# Patient Record
Sex: FEMALE | Race: OTHER | HISPANIC OR LATINO | Employment: FULL TIME | URBAN - METROPOLITAN AREA
[De-identification: names, ages, dates, MRNs, and addresses within clinical notes are randomized per-mention and may not be internally consistent; named-entity substitution may affect disease eponyms.]

---

## 2022-07-20 ENCOUNTER — OFFICE VISIT (OUTPATIENT)
Dept: GASTROENTEROLOGY | Facility: CLINIC | Age: 47
End: 2022-07-20
Payer: COMMERCIAL

## 2022-07-20 ENCOUNTER — TELEPHONE (OUTPATIENT)
Dept: GASTROENTEROLOGY | Facility: CLINIC | Age: 47
End: 2022-07-20

## 2022-07-20 VITALS
WEIGHT: 169 LBS | SYSTOLIC BLOOD PRESSURE: 122 MMHG | HEART RATE: 86 BPM | HEIGHT: 65 IN | BODY MASS INDEX: 28.16 KG/M2 | DIASTOLIC BLOOD PRESSURE: 80 MMHG

## 2022-07-20 DIAGNOSIS — R11.0 NAUSEA: ICD-10-CM

## 2022-07-20 DIAGNOSIS — Z12.11 SCREENING FOR COLON CANCER: ICD-10-CM

## 2022-07-20 DIAGNOSIS — K21.9 GASTROESOPHAGEAL REFLUX DISEASE, UNSPECIFIED WHETHER ESOPHAGITIS PRESENT: ICD-10-CM

## 2022-07-20 DIAGNOSIS — R14.0 BLOATING: ICD-10-CM

## 2022-07-20 DIAGNOSIS — R10.84 GENERALIZED ABDOMINAL PAIN: Primary | ICD-10-CM

## 2022-07-20 PROCEDURE — 99204 OFFICE O/P NEW MOD 45 MIN: CPT | Performed by: NURSE PRACTITIONER

## 2022-07-20 RX ORDER — AMLODIPINE BESYLATE 10 MG/1
10 TABLET ORAL DAILY
COMMUNITY
Start: 2022-06-26

## 2022-07-20 RX ORDER — LOSARTAN POTASSIUM 100 MG/1
100 TABLET ORAL DAILY
COMMUNITY
Start: 2022-06-03

## 2022-07-20 RX ORDER — PANTOPRAZOLE SODIUM 40 MG/1
40 TABLET, DELAYED RELEASE ORAL DAILY
Qty: 30 TABLET | Refills: 6 | Status: SHIPPED | OUTPATIENT
Start: 2022-07-20 | End: 2022-08-18

## 2022-07-20 RX ORDER — METOPROLOL SUCCINATE 50 MG/1
50 TABLET, EXTENDED RELEASE ORAL DAILY
COMMUNITY
Start: 2022-06-17

## 2022-07-20 RX ORDER — ATORVASTATIN CALCIUM 20 MG/1
20 TABLET, FILM COATED ORAL DAILY
COMMUNITY
Start: 2022-07-19

## 2022-07-20 RX ORDER — TRAMADOL HYDROCHLORIDE 50 MG/1
50 TABLET ORAL EVERY 6 HOURS PRN
COMMUNITY
Start: 2022-05-01

## 2022-07-20 RX ORDER — FAMOTIDINE 20 MG/1
20 TABLET, FILM COATED ORAL 2 TIMES DAILY
Qty: 30 TABLET | Refills: 6 | Status: SHIPPED | OUTPATIENT
Start: 2022-07-20 | End: 2022-08-03

## 2022-07-20 RX ORDER — DULAGLUTIDE 3 MG/.5ML
INJECTION, SOLUTION SUBCUTANEOUS
COMMUNITY
Start: 2022-05-04

## 2022-07-20 RX ORDER — POLYETHYLENE GLYCOL 3350, SODIUM SULFATE ANHYDROUS, SODIUM BICARBONATE, SODIUM CHLORIDE, POTASSIUM CHLORIDE 236; 22.74; 6.74; 5.86; 2.97 G/4L; G/4L; G/4L; G/4L; G/4L
4000 POWDER, FOR SOLUTION ORAL ONCE
Qty: 4000 ML | Refills: 0 | OUTPATIENT
Start: 2022-07-20 | End: 2022-09-14

## 2022-07-20 NOTE — PROGRESS NOTES
[facility] - Outpatient Consultation  Luwanna Mohs 49 y o  female MRN: [id number]  Encounter: [id number]    ASSESSMENT AND PLAN:      1  Generalized abdominal pain  2  Bloating  3  Nausea  Patient referred by her PCP for evaluation of stomach pain  Patient states she has been having abdominal discomfort, bloating and nausea for approximately 3 months  States she does have increased symptoms at times after eating  She states that dairy and meats make it worse  States her nausea is pretty consistent  On exam, patient does complain of generalized abdominal discomfort increased with palpation  Consider diabetic gastroparesis, IBS, celiac, H pylori, SIBO, diverticular disease  Pending results of EGD and colonoscopy consider gastric emptying scan and or breath test for SIBO  Biopsy for celiac and H pylori     - CBC and differential; Future  - Comprehensive metabolic panel  - TSH, 3rd generation; Future  - CBC and differential  - TSH, 3rd generation  - Colonoscopy/EGD schedule that [address] [place] [address]    4  Gastroesophageal reflux disease, unspecified whether esophagitis present  Patient states that she does have increased acid reflux symptoms  She states the are increased at night as well  She states the symptoms are fairly constant  She denies difficulty swallowing  Discussed with patient and her daughter starting a trial of pantoprazole 40 mg daily prior to a m  Meal and also famotidine 20 mg to be taken HS  Continuation of medication may depend on EGD findings  Consider GERD, functional dyspepsia  - decreased caffeine use  - EGD scheduled at [address] [place] [address]  - pantoprazole (PROTONIX) 40 mg tablet; Take 1 tablet (40 mg total) by mouth daily  Dispense: 30 tablet; Refill: 6  - famotidine (PEPCID) 20 mg tablet; Take 1 tablet (20 mg total) by mouth 2 (two) times a day  Dispense: 30 tablet; Refill: 6    5  Screening for colon cancer  Patient states she has never had a colonoscopy    Denies family history of colon cancer     - Colonoscopy scheduled at [facility])  - polyethylene glycol (Golytely) 4000 mL solution; Take 4,000 mL by mouth once for 1 dose Take 4000 mL by mouth once for 1 dose  Use as directed  Dispense: 4000 mL; Refill: 0      Followup Appointment:  After procedures  ______________________________________________________________________    Chief Complaint   Patient presents with    Bloating, nausea when eating     Referred by Dr Aline Tejeda         HPI:   Valente Duverney is a 49y o  year old female referred by her PCP, accompanied by her daughter Lindy Manjarrez with past medical history of diabetes, hypertension, hyperlipidemia presents presents with abdominal pain, bloating, nausea and acid reflux symptoms for approximately the last 3 months  Patient states she does get abdominal discomfort increased bloating approximately 1 hour after eating  States she has constant nausea and rare vomiting  She denies hematemesis with vomiting  She states she does have acid reflux symptoms which increased at nighttime  She does state that dairy and meats have been noted to be a trigger  States he is having daily normal bowel movements  Denies hematochezia or melena  Nonsmoker, denies ETOH use  States she has never had a colonoscopy  Denies family history of colon cancer      Historical Information   Past Medical History:   Diagnosis Date    Diabetes mellitus ([facility] [address] )     Hyperlipidemia     Hypertension      Past Surgical History:   Procedure Laterality Date    APPENDECTOMY       SECTION       Social History     Substance and Sexual Activity   Alcohol Use Not Currently     Social History     Substance and Sexual Activity   Drug Use Not on file     Social History     Tobacco Use   Smoking Status Never Smoker   Smokeless Tobacco Never Used     Family History   Problem Relation Age of Onset    Colon polyps Neg Hx     Colon cancer Neg Hx        Meds/Allergies     Current Outpatient Medications:     amLODIPine (NORVASC) 10 mg tablet    atorvastatin (LIPITOR) 20 mg tablet    famotidine (PEPCID) 20 mg tablet    losartan (COZAAR) 100 MG tablet    metFORMIN (GLUCOPHAGE) 1000 MG tablet    metoprolol succinate (TOPROL-XL) 50 mg 24 hr tablet    pantoprazole (PROTONIX) 40 mg tablet    polyethylene glycol (Golytely) 4000 mL solution    traMADol (ULTRAM) 50 mg tablet    Trulicity 3 KT/1 1KL SOPN    No Known Allergies    PHYSICAL EXAM:    Blood pressure 122/80, pulse 86, height 5' 5" (1 651 m), weight 76 7 kg (169 lb)  Body mass index is 28 12 kg/m²  General Appearance: NAD, cooperative, alert, language barrier, daughter is present to assist with interpretation  Eyes: Anicteric, PERRLA, EOMI  ENT:  Normocephalic, atraumatic, normal mucosa  Neck:  Supple, symmetrical, trachea midline,   Resp:  Clear to auscultation bilaterally; no rales, rhonchi or wheezing; respirations unlabored   CV:  S1 S2, Regular rate and rhythm; no murmur, rub, or gallop  GI:  Soft, generalized abdominal discomfort increased with palpation, non-distended; normal bowel sounds; no masses, no organomegaly   Rectal: Deferred  Musculoskeletal: No cyanosis, clubbing or edema  Normal ROM  Skin:  No jaundice, rashes, or lesions   Heme/Lymph: No palpable cervical lymphadenopathy  Psych: Normal affect, good eye contact  Neuro: No gross deficits, AAOx3    Lab Results:   No results found for: WBC, HGB, HCT, MCV, PLT  No results found for: NA, K, CL, CO2, ANIONGAP, BUN, CREATININE, GLUCOSE, GLUF, CALCIUM, CORRECTEDCA, AST, ALT, ALKPHOS, PROT, BILITOT, EGFR  No results found for: IRON, TIBC, FERRITIN  No results found for: LIPASE    Radiology Results:   No results found  REVIEW OF SYSTEMS:    CONSTITUTIONAL: Denies any fever, chills, rigors, and weight loss  HEENT: No earache or tinnitus  Denies hearing loss or visual disturbances  CARDIOVASCULAR: No chest pain or palpitations     RESPIRATORY: Denies any cough, hemoptysis, shortness of breath or dyspnea on exertion  GASTROINTESTINAL: As noted in the History of Present Illness  GENITOURINARY: No problems with urination  Denies any hematuria or dysuria  NEUROLOGIC: No dizziness or vertigo, denies headaches  MUSCULOSKELETAL: Denies any muscle or joint pain  SKIN: Denies skin rashes or itching  ENDOCRINE: Denies excessive thirst  Denies intolerance to heat or cold  PSYCHOSOCIAL: Denies depression or anxiety  Denies any recent memory loss

## 2022-07-20 NOTE — TELEPHONE ENCOUNTER
Scheduled date of colonoscopy (as of today):9/14/22  Physician performing colonoscopy:Dr Grant Desouza  Location of colonoscopy:[facility]  Bowel prep reviewed with patient:Natalie  Instructions reviewed with patient by: Zayda Hanson  Clearances: No

## 2022-08-03 DIAGNOSIS — K21.9 GASTROESOPHAGEAL REFLUX DISEASE, UNSPECIFIED WHETHER ESOPHAGITIS PRESENT: ICD-10-CM

## 2022-08-03 RX ORDER — FAMOTIDINE 20 MG/1
TABLET, FILM COATED ORAL
Qty: 180 TABLET | Refills: 2 | Status: SHIPPED | OUTPATIENT
Start: 2022-08-03 | End: 2022-10-25 | Stop reason: ALTCHOICE

## 2022-08-18 DIAGNOSIS — K21.9 GASTROESOPHAGEAL REFLUX DISEASE, UNSPECIFIED WHETHER ESOPHAGITIS PRESENT: ICD-10-CM

## 2022-08-18 RX ORDER — PANTOPRAZOLE SODIUM 40 MG/1
TABLET, DELAYED RELEASE ORAL
Qty: 90 TABLET | Refills: 3 | Status: SHIPPED | OUTPATIENT
Start: 2022-08-18

## 2022-08-30 LAB
ALBUMIN SERPL-MCNC: 4.4 G/DL (ref 3.8–4.8)
ALBUMIN/GLOB SERPL: 1.4 {RATIO} (ref 1.2–2.2)
ALP SERPL-CCNC: 103 IU/L (ref 44–121)
ALT SERPL-CCNC: 29 IU/L (ref 0–32)
AST SERPL-CCNC: 20 IU/L (ref 0–40)
BASOPHILS # BLD AUTO: 0.1 X10E3/UL (ref 0–0.2)
BASOPHILS NFR BLD AUTO: 1 %
BILIRUB SERPL-MCNC: 0.3 MG/DL (ref 0–1.2)
BUN SERPL-MCNC: 11 MG/DL (ref 6–24)
BUN/CREAT SERPL: 15 (ref 9–23)
CALCIUM SERPL-MCNC: 9.2 MG/DL (ref 8.7–10.2)
CHLORIDE SERPL-SCNC: 101 MMOL/L (ref 96–106)
CO2 SERPL-SCNC: 20 MMOL/L (ref 20–29)
CREAT SERPL-MCNC: 0.73 MG/DL (ref 0.57–1)
EGFR: 101 ML/MIN/1.73
EOSINOPHIL # BLD AUTO: 0.1 X10E3/UL (ref 0–0.4)
EOSINOPHIL NFR BLD AUTO: 2 %
ERYTHROCYTE [DISTWIDTH] IN BLOOD BY AUTOMATED COUNT: 13.4 % (ref 11.7–15.4)
GLOBULIN SER-MCNC: 3.1 G/DL (ref 1.5–4.5)
GLUCOSE SERPL-MCNC: 314 MG/DL (ref 65–99)
HCT VFR BLD AUTO: 39.3 % (ref 34–46.6)
HGB BLD-MCNC: 12.9 G/DL (ref 11.1–15.9)
IMM GRANULOCYTES # BLD: 0 X10E3/UL (ref 0–0.1)
IMM GRANULOCYTES NFR BLD: 0 %
LYMPHOCYTES # BLD AUTO: 2.1 X10E3/UL (ref 0.7–3.1)
LYMPHOCYTES NFR BLD AUTO: 31 %
MCH RBC QN AUTO: 27.9 PG (ref 26.6–33)
MCHC RBC AUTO-ENTMCNC: 32.8 G/DL (ref 31.5–35.7)
MCV RBC AUTO: 85 FL (ref 79–97)
MONOCYTES # BLD AUTO: 0.3 X10E3/UL (ref 0.1–0.9)
MONOCYTES NFR BLD AUTO: 4 %
NEUTROPHILS # BLD AUTO: 4.3 X10E3/UL (ref 1.4–7)
NEUTROPHILS NFR BLD AUTO: 62 %
PLATELET # BLD AUTO: 362 X10E3/UL (ref 150–450)
POTASSIUM SERPL-SCNC: 4 MMOL/L (ref 3.5–5.2)
PROT SERPL-MCNC: 7.5 G/DL (ref 6–8.5)
RBC # BLD AUTO: 4.62 X10E6/UL (ref 3.77–5.28)
SODIUM SERPL-SCNC: 137 MMOL/L (ref 134–144)
TSH SERPL DL<=0.005 MIU/L-ACNC: 0.17 UIU/ML (ref 0.45–4.5)
WBC # BLD AUTO: 6.9 X10E3/UL (ref 3.4–10.8)

## 2022-09-01 ENCOUNTER — TELEPHONE (OUTPATIENT)
Dept: GASTROENTEROLOGY | Facility: CLINIC | Age: 47
End: 2022-09-01

## 2022-09-01 NOTE — TELEPHONE ENCOUNTER
Spoke to patient's daughter in regards to her blood work ordered from her last office visit  Noted elevated blood sugar and low TSH  Patient's daughter states they are aware of her TSH from the past however she will contact the patient's PCP regarding the lab results

## 2022-09-14 ENCOUNTER — HOSPITAL ENCOUNTER (OUTPATIENT)
Dept: GASTROENTEROLOGY | Facility: AMBULATORY SURGERY CENTER | Age: 47
Discharge: HOME | End: 2022-09-14
Payer: COMMERCIAL

## 2022-09-14 ENCOUNTER — ANESTHESIA (OUTPATIENT)
Dept: GASTROENTEROLOGY | Facility: AMBULATORY SURGERY CENTER | Age: 47
End: 2022-09-14

## 2022-09-14 ENCOUNTER — ANESTHESIA EVENT (OUTPATIENT)
Dept: GASTROENTEROLOGY | Facility: AMBULATORY SURGERY CENTER | Age: 47
End: 2022-09-14

## 2022-09-14 VITALS
HEART RATE: 65 BPM | RESPIRATION RATE: 15 BRPM | SYSTOLIC BLOOD PRESSURE: 165 MMHG | DIASTOLIC BLOOD PRESSURE: 95 MMHG | WEIGHT: 169 LBS | HEIGHT: 65 IN | BODY MASS INDEX: 28.16 KG/M2 | OXYGEN SATURATION: 100 % | TEMPERATURE: 99 F

## 2022-09-14 DIAGNOSIS — R14.0 BLOATING: ICD-10-CM

## 2022-09-14 DIAGNOSIS — R11.0 NAUSEA: ICD-10-CM

## 2022-09-14 DIAGNOSIS — R10.84 GENERALIZED ABDOMINAL PAIN: ICD-10-CM

## 2022-09-14 DIAGNOSIS — Z12.11 SCREENING FOR COLON CANCER: ICD-10-CM

## 2022-09-14 DIAGNOSIS — K21.9 GASTROESOPHAGEAL REFLUX DISEASE, UNSPECIFIED WHETHER ESOPHAGITIS PRESENT: ICD-10-CM

## 2022-09-14 LAB
EXT PREGNANCY TEST URINE: NEGATIVE
EXT. CONTROL: NORMAL

## 2022-09-14 PROCEDURE — 43239 EGD BIOPSY SINGLE/MULTIPLE: CPT | Performed by: INTERNAL MEDICINE

## 2022-09-14 PROCEDURE — 45385 COLONOSCOPY W/LESION REMOVAL: CPT | Performed by: INTERNAL MEDICINE

## 2022-09-14 PROCEDURE — 45380 COLONOSCOPY AND BIOPSY: CPT | Performed by: INTERNAL MEDICINE

## 2022-09-14 RX ORDER — PROPOFOL 10 MG/ML
INJECTION, EMULSION INTRAVENOUS AS NEEDED
Status: DISCONTINUED | OUTPATIENT
Start: 2022-09-14 | End: 2022-09-14

## 2022-09-14 RX ORDER — SODIUM CHLORIDE, SODIUM LACTATE, POTASSIUM CHLORIDE, CALCIUM CHLORIDE 600; 310; 30; 20 MG/100ML; MG/100ML; MG/100ML; MG/100ML
INJECTION, SOLUTION INTRAVENOUS CONTINUOUS PRN
Status: DISCONTINUED | OUTPATIENT
Start: 2022-09-14 | End: 2022-09-14

## 2022-09-14 RX ORDER — SODIUM CHLORIDE, SODIUM LACTATE, POTASSIUM CHLORIDE, CALCIUM CHLORIDE 600; 310; 30; 20 MG/100ML; MG/100ML; MG/100ML; MG/100ML
50 INJECTION, SOLUTION INTRAVENOUS CONTINUOUS
Status: DISCONTINUED | OUTPATIENT
Start: 2022-09-14 | End: 2022-09-14

## 2022-09-14 RX ORDER — LIDOCAINE HYDROCHLORIDE 10 MG/ML
INJECTION, SOLUTION EPIDURAL; INFILTRATION; INTRACAUDAL; PERINEURAL AS NEEDED
Status: DISCONTINUED | OUTPATIENT
Start: 2022-09-14 | End: 2022-09-14

## 2022-09-14 RX ADMIN — PROPOFOL 50 MG: 10 INJECTION, EMULSION INTRAVENOUS at 07:40

## 2022-09-14 RX ADMIN — PROPOFOL 50 MG: 10 INJECTION, EMULSION INTRAVENOUS at 07:46

## 2022-09-14 RX ADMIN — PROPOFOL 150 MG: 10 INJECTION, EMULSION INTRAVENOUS at 07:32

## 2022-09-14 RX ADMIN — SODIUM CHLORIDE, SODIUM LACTATE, POTASSIUM CHLORIDE, CALCIUM CHLORIDE: 600; 310; 30; 20 INJECTION, SOLUTION INTRAVENOUS at 07:27

## 2022-09-14 RX ADMIN — SODIUM CHLORIDE, SODIUM LACTATE, POTASSIUM CHLORIDE, CALCIUM CHLORIDE: 600; 310; 30; 20 INJECTION, SOLUTION INTRAVENOUS at 08:09

## 2022-09-14 RX ADMIN — LIDOCAINE HYDROCHLORIDE 80 MG: 10 INJECTION, SOLUTION EPIDURAL; INFILTRATION; INTRACAUDAL; PERINEURAL at 07:32

## 2022-09-14 RX ADMIN — PROPOFOL 30 MG: 10 INJECTION, EMULSION INTRAVENOUS at 08:02

## 2022-09-14 RX ADMIN — PROPOFOL 40 MG: 10 INJECTION, EMULSION INTRAVENOUS at 08:06

## 2022-09-14 RX ADMIN — PROPOFOL 30 MG: 10 INJECTION, EMULSION INTRAVENOUS at 07:56

## 2022-09-14 RX ADMIN — PROPOFOL 50 MG: 10 INJECTION, EMULSION INTRAVENOUS at 07:41

## 2022-09-14 RX ADMIN — SODIUM CHLORIDE, SODIUM LACTATE, POTASSIUM CHLORIDE, CALCIUM CHLORIDE 50 ML/HR: 600; 310; 30; 20 INJECTION, SOLUTION INTRAVENOUS at 07:24

## 2022-09-14 RX ADMIN — PROPOFOL 50 MG: 10 INJECTION, EMULSION INTRAVENOUS at 07:48

## 2022-09-14 RX ADMIN — PROPOFOL 50 MG: 10 INJECTION, EMULSION INTRAVENOUS at 07:51

## 2022-09-14 NOTE — PROGRESS NOTES
Blood sugar recheck post procedure was 290  Dr Sarah Rodriguez notified  NNO   Patient and son reminded to follow up with  about high blood sugars

## 2022-09-14 NOTE — ANESTHESIA PREPROCEDURE EVALUATION
Procedure:  COLONOSCOPY  EGD    Relevant Problems   CARDIO   (+) Hyperlipidemia   (+) Hypertension      GI/HEPATIC   (+) Gastroesophageal reflux disease      Endocrine   (+) Diabetes mellitus (HCC)        Physical Exam    Airway    Mallampati score: III  TM Distance: >3 FB  Neck ROM: full     Dental       Cardiovascular      Pulmonary      Other Findings        Anesthesia Plan  ASA Score- 2     Anesthesia Type- IV sedation with anesthesia with ASA Monitors  Additional Monitors:   Airway Plan:           Plan Factors-Exercise tolerance (METS): >4 METS  Chart reviewed  Existing labs reviewed  Patient summary reviewed  Patient is not a current smoker  Induction- intravenous  Postoperative Plan-     Informed Consent- Anesthetic plan and risks discussed with patient  I personally reviewed this patient with the CRNA  Discussed and agreed on the Anesthesia Plan with the CRNA  Belinda Smith

## 2022-09-14 NOTE — H&P
History and Physical - [facility]    Ti Uribe 49 y o  female MRN: [id number]      HPI: Ti Uribe is a 49y o  year old female who presents for generalized abd pain, n/v  1st colonoscopy, avg risk  REVIEW OF SYSTEMS: Per the HPI, and otherwise unremarkable      Historical Information     Past Medical History:   Diagnosis Date    Diabetes mellitus ([address] )     Hyperlipidemia     Hypertension      Past Surgical History:   Procedure Laterality Date    APPENDECTOMY       SECTION       Social History   Social History     Substance and Sexual Activity   Alcohol Use Not Currently     Social History     Substance and Sexual Activity   Drug Use Never     Social History     Tobacco Use   Smoking Status Never Smoker   Smokeless Tobacco Never Used     Family History   Problem Relation Age of Onset    Colon polyps Neg Hx     Colon cancer Neg Hx        Meds/Allergies       Current Outpatient Medications:     amLODIPine (NORVASC) 10 mg tablet    atorvastatin (LIPITOR) 20 mg tablet    famotidine (PEPCID) 20 mg tablet    losartan (COZAAR) 100 MG tablet    metFORMIN (GLUCOPHAGE) 1000 MG tablet    metoprolol succinate (TOPROL-XL) 50 mg 24 hr tablet    pantoprazole (PROTONIX) 40 mg tablet    traMADol (ULTRAM) 50 mg tablet    Trulicity 3 PS/6 7NE SOPN    polyethylene glycol (Golytely) 4000 mL solution    Current Facility-Administered Medications:     lactated ringers infusion, 50 mL/hr, Intravenous, Continuous    No Known Allergies    Objective     /61   Pulse 61   Temp 99 °F (37 2 °C) (Temporal)   Resp 19   Ht 5' 5" (1 651 m)   Wt 76 7 kg (169 lb)   LMP 2022   SpO2 99%   BMI 28 12 kg/m²       PHYSICAL EXAM    Gen: NAD AAOx3  Head: Normocephalic, Atraumatic  CV: S1S2 RRR no m/r/g  CHEST: Clear b/l no c/r/w  ABD: soft, +BS NT/ND no masses  EXT: no edema      ASSESSMENT/PLAN:  This is a 49y o  year old female here for EGD/COLON, and she is stable and optimized for her procedure

## 2022-09-14 NOTE — ANESTHESIA POSTPROCEDURE EVALUATION
Post-Op Assessment Note    CV Status:  Stable    Pain management: adequate     Mental Status:  Sleepy   Hydration Status:  Euvolemic   PONV Controlled:  Controlled   Airway Patency:  Patent      Post Op Vitals Reviewed: Yes      Staff: Anesthesiologist, CRNA         No complications documented      /76 (09/14/22 0811)    Temp      Pulse 56 (09/14/22 0811)   Resp 14 (09/14/22 0811)    SpO2 100 % (09/14/22 0811)

## 2022-09-28 ENCOUNTER — TELEPHONE (OUTPATIENT)
Dept: GASTROENTEROLOGY | Facility: CLINIC | Age: 47
End: 2022-09-28

## 2022-10-25 ENCOUNTER — OFFICE VISIT (OUTPATIENT)
Dept: GASTROENTEROLOGY | Facility: CLINIC | Age: 47
End: 2022-10-25
Payer: COMMERCIAL

## 2022-10-25 VITALS
SYSTOLIC BLOOD PRESSURE: 126 MMHG | WEIGHT: 167 LBS | BODY MASS INDEX: 27.82 KG/M2 | HEIGHT: 65 IN | DIASTOLIC BLOOD PRESSURE: 80 MMHG

## 2022-10-25 DIAGNOSIS — K21.9 GASTROESOPHAGEAL REFLUX DISEASE, UNSPECIFIED WHETHER ESOPHAGITIS PRESENT: Primary | ICD-10-CM

## 2022-10-25 DIAGNOSIS — R10.13 EPIGASTRIC PAIN: ICD-10-CM

## 2022-10-25 DIAGNOSIS — Z86.010 HISTORY OF COLON POLYPS: ICD-10-CM

## 2022-10-25 PROCEDURE — 99213 OFFICE O/P EST LOW 20 MIN: CPT | Performed by: NURSE PRACTITIONER

## 2022-10-25 RX ORDER — DICYCLOMINE HYDROCHLORIDE 10 MG/1
10 CAPSULE ORAL
Qty: 120 CAPSULE | Refills: 4 | Status: SHIPPED | OUTPATIENT
Start: 2022-10-25

## 2022-10-25 NOTE — PATIENT INSTRUCTIONS
Preparation H for hemorrhoid treatment   20-25 g of fiber per day   MiraLax, adjust for bowel movement every day or every other day   Stool softener, Colace 100 mg  once or twice a day      Adequate fluids

## 2022-10-27 DIAGNOSIS — A04.8 H. PYLORI INFECTION: Primary | ICD-10-CM

## 2022-10-27 RX ORDER — BISMUTH SUBCITRATE POTASSIUM, METRONIDAZOLE, TETRACYCLINE HYDROCHLORIDE 140; 125; 125 MG/1; MG/1; MG/1
3 CAPSULE ORAL
Qty: 120 CAPSULE | Refills: 0 | Status: SHIPPED | OUTPATIENT
Start: 2022-10-27 | End: 2022-11-06

## 2022-10-27 RX ORDER — PANTOPRAZOLE SODIUM 40 MG/1
40 TABLET, DELAYED RELEASE ORAL DAILY
Qty: 10 TABLET | Refills: 0 | Status: SHIPPED | OUTPATIENT
Start: 2022-10-27

## 2022-11-18 DIAGNOSIS — R10.13 EPIGASTRIC PAIN: ICD-10-CM

## 2022-11-18 RX ORDER — DICYCLOMINE HYDROCHLORIDE 10 MG/1
CAPSULE ORAL
Qty: 360 CAPSULE | Refills: 2 | Status: SHIPPED | OUTPATIENT
Start: 2022-11-18

## 2022-12-05 NOTE — TELEPHONE ENCOUNTER
NURSING: + H pylori on antral bx - called pt, no answer, left VM - pt has appointment coming up in 10/25 - so emphasized she keeps that appointment  Will need treatment for H pylori at that time  Nonurgent if she calls back, just ask pt to f/u then and Herm can tx the H pylori       Biopsies otherwise ok  No

## 2022-12-16 NOTE — PROGRESS NOTES
[facility] - Outpatient Follow-up Note  Ajay Juarez 49 y o  female MRN: [id number]  Encounter: [id number]    ASSESSMENT AND PLAN:      1  Gastroesophageal reflux disease, unspecified whether esophagitis present  Patient states since being placed on Protonix 40 mg daily she has not had any acid reflux symptoms  She does however state if she misses a dose she does experience acid reflux symptoms  - continue Protonix 40 mg daily  - reinforced dietary discretion    2  Epigastric pain  On exam patient does exhibit increased epigastric discomfort with palpation  She states her abdominal discomfort is increased overnight and after eating a meal   Discussed with patient proper dietary discretion  Will trial Bentyl 10 mg prior to meals and HS  May consider adding Pepcid 20-40 mg HS if unsuccessful with Bentyl trial     - no eating 2-3 hours prior to bedtime  - dicyclomine (BENTYL) 10 mg capsule; Take 1 capsule (10 mg total) by mouth 4 (four) times a day (before meals and at bedtime)  Dispense: 120 capsule; Refill: 4    3  History of colon polyps  Colonoscopy 09/14/2022; 7 year recall    4  Hemorrhoids  Discussed with patient using preparation H for inflamed hemorrhoids  Also discussed if bleeding continues can be scheduled for banding procedure  Also discussed adequate fiber intake and fluids  Would also consider stool softeners and MiraLax for constipation to reduce straining for bowel movements  Followup Appointment:  3-4 months  ______________________________________________________________________    Chief Complaint   Patient presents with   • Follow-up     HPI:  14-year-old female accompanied by her daughter Esme Flores with past medical history of diabetes and GERD presents for follow-up EGD and colonoscopy 09/14/2022  On exam, patient denies nausea or vomiting  States she is having increased abdominal discomfort however notes it happens more at nighttime    She does have My Asthma Action Plan    Name: Aaron Watson   YOB: 2017  Date: 12/16/2022   My doctor: Nina Hollingsworth MD   My clinic: [facility]        My Rescue Medicine:   Albuterol nebulizer solution 1 vial EVERY 4 HOURS as needed    - OR -  Albuterol inhaler (Proair/Ventolin/Proventil HFA)  2 puffs EVERY 4 HOURS as needed. Use a spacer if recommended by your provider.   My Asthma Severity:   Intermittent / Exercise Induced       The medication may be given at school or day care?: Yes  Child can carry and use inhaler at school with approval of school nurse?: No       GREEN ZONE   Good Control    I feel good    No cough or wheeze    Can work, sleep and play without asthma symptoms       Take your asthma control medicine every day.     1. If exercise triggers your asthma, take your rescue medication    15 minutes before exercise or sports, and    During exercise if you have asthma symptoms  2. Spacer to use with inhaler: If you have a spacer, make sure to use it with your inhaler             YELLOW ZONE Getting Worse  I have ANY of these:    I do not feel good    Cough or wheeze    Chest feels tight    Wake up at night   1. Keep taking your Green Zone medications  2. Start taking your rescue medicine:    every 20 minutes for up to 1 hour. Then every 4 hours for 24-48 hours.  3. If you stay in the Yellow Zone for more than 12-24 hours, contact your doctor.  4. If you do not return to the Green Zone in 12-24 hours or you get worse, start taking your oral steroid medicine if prescribed by your provider.           RED ZONE Medical Alert - Get Help  I have ANY of these:    I feel awful    Medicine is not helping    Breathing getting harder    Trouble walking or talking    Nose opens wide to breathe       1. Take your rescue medicine NOW  2. If your provider has prescribed an oral steroid medicine, start taking it NOW  3. Call your doctor NOW  4. If you are still in the Red Zone after 20  epigastric discomfort with palpation on exam   Denies acid reflux symptoms while taking Protonix 40 mg daily  States she is having daily normal bowel movements  Denies hematochezia or melena  Colonoscopy revealed 2 sessile polyps, small internal hemorrhoid, 7 year recall  EGD centrally normal, biopsy for H pylori and celiac negative  Patient inquired about what to do regarding hemorrhoids noted on colonoscopy  Explained if she was to have increased discomfort or bleeding she can call the office and make an appointment for a banding procedure  Historical Information   Past Medical History:   Diagnosis Date   • Diabetes mellitus ([address] )    • Hyperlipidemia    • Hypertension      Past Surgical History:   Procedure Laterality Date   • APPENDECTOMY     •  SECTION       Social History     Substance and Sexual Activity   Alcohol Use Not Currently     Social History     Substance and Sexual Activity   Drug Use Never     Social History     Tobacco Use   Smoking Status Never Smoker   Smokeless Tobacco Never Used     Family History   Problem Relation Age of Onset   • Colon polyps Neg Hx    • Colon cancer Neg Hx          Current Outpatient Medications:   •  dicyclomine (BENTYL) 10 mg capsule  •  amLODIPine (NORVASC) 10 mg tablet  •  atorvastatin (LIPITOR) 20 mg tablet  •  losartan (COZAAR) 100 MG tablet  •  metFORMIN (GLUCOPHAGE) 1000 MG tablet  •  metoprolol succinate (TOPROL-XL) 50 mg 24 hr tablet  •  pantoprazole (PROTONIX) 40 mg tablet  •  traMADol (ULTRAM) 50 mg tablet  •  Trulicity 3 ZZ/3 9YG SOPN  No Known Allergies  Reviewed medications and allergies and updated as indicated    PHYSICAL EXAM:    Blood pressure 126/80, height 5' 5" (1 651 m), weight 75 8 kg (167 lb)  Body mass index is 27 79 kg/m²  General Appearance: NAD, cooperative, alert  Eyes: Anicteric, PERRLA, EOMI  ENT:  Normocephalic, atraumatic, normal mucosa      Neck:  Supple, symmetrical, trachea midline  Resp:  Clear to auscultation minutes and you have not reached your doctor:    Take your rescue medicine again and    Call 911 or go to the emergency room right away    See your regular doctor within 2 weeks of an Emergency Room or Urgent Care visit for follow-up treatment.          Annual Reminders:  Meet with Asthma Educator. Make sure your child gets their flu shot in the fall and is up to date with all vaccines.    Pharmacy: [facility] [id number] - [place], [place] - [address]    Electronically signed by Nina Hollingsworth MD   Date: 12/16/22                        Asthma Triggers  How To Control Things That Make Your Asthma Worse     Triggers are things that make your asthma worse.  Look at the list below to help you find your triggers and what you can do about them.  You can help prevent asthma flare-ups by staying away from your triggers.      Trigger                                                          What you can do   Cigarette Smoke  Tobacco smoke can make asthma worse. Do not allow smoking in your home, car or around you.  Be sure no one smokes at a child s day care or school.  If you smoke, ask your health care provider for ways to help you quit.  Ask family members to quit too.  Ask your health care provider for a referral to Quit Plan to help you quit smoking, or call 1-164-716-PLAN.     Colds, Flu, Bronchitis  These are common triggers of asthma. Wash your hands often.  Don t touch your eyes, nose or mouth.  Get a flu shot every year.     Dust Mites  These are tiny bugs that live in cloth or carpet. They are too small to see. Wash sheets and blankets in hot water every week.   Encase pillows and mattress in dust mite proof covers.  Avoid having carpet if you can. If you have carpet, vacuum weekly.   Use a dust mask and HEPA vacuum.   Pollen and Outdoor Mold  Some people are allergic to trees, grass, or weed pollen, or molds. Try to keep your windows closed.  Limit time out doors when pollen count is high.   Ask you health  bilaterally; no rales, rhonchi or wheezing; respirations unlabored   CV:  S1 S2, Regular rate and rhythm; no murmur, rub, or gallop  GI:  Soft, epigastric pain with palpation, non-distended; normal bowel sounds; no masses, no organomegaly   Rectal: Deferred  Musculoskeletal: No cyanosis, clubbing or edema  Normal ROM  Skin:  No jaundice, rashes, or lesions   Heme/Lymph: No palpable cervical lymphadenopathy  Psych: Normal affect, good eye contact  Neuro: No gross deficits, AAOx3    Lab Results:   Lab Results   Component Value Date    WBC 6 9 08/29/2022    HGB 12 9 08/29/2022    HCT 39 3 08/29/2022    MCV 85 08/29/2022     08/29/2022     Lab Results   Component Value Date    K 4 0 08/29/2022     08/29/2022    CO2 20 08/29/2022    BUN 11 08/29/2022    CREATININE 0 73 08/29/2022    AST 20 08/29/2022    ALT 29 08/29/2022    EGFR 101 08/29/2022     No results found for: IRON, TIBC, FERRITIN  No results found for: LIPASE    Radiology Results:   No results found  care provider about taking medicine during allergy season.     Animal Dander  Some people are allergic to skin flakes, urine or saliva from pets with fur or feathers. Keep pets with fur or feathers out of your home.    If you can t keep the pet outdoors, then keep the pet out of your bedroom.  Keep the bedroom door closed.  Keep pets off cloth furniture and away from stuffed toys.     Mice, Rats, and Cockroaches  Some people are allergic to the waste from these pests.   Cover food and garbage.  Clean up spills and food crumbs.  Store grease in the refrigerator.   Keep food out of the bedroom.   Indoor Mold  This can be a trigger if your home has high moisture. Fix leaking faucets, pipes, or other sources of water.   Clean moldy surfaces.  Dehumidify basement if it is damp and smelly.   Smoke, Strong Odors, and Sprays  These can reduce air quality. Stay away from strong odors and sprays, such as perfume, powder, hair spray, paints, smoke incense, paint, cleaning products, candles and new carpet.   Exercise or Sports  Some people with asthma have this trigger. Be active!  Ask your doctor about taking medicine before sports or exercise to prevent symptoms.    Warm up for 5-10 minutes before and after sports or exercise.     Other Triggers of Asthma  Cold air:  Cover your nose and mouth with a scarf.  Sometimes laughing or crying can be a trigger.  Some medicines and food can trigger asthma.

## 2023-01-31 ENCOUNTER — OFFICE VISIT (OUTPATIENT)
Dept: GASTROENTEROLOGY | Facility: CLINIC | Age: 48
End: 2023-01-31

## 2023-01-31 VITALS
WEIGHT: 164 LBS | BODY MASS INDEX: 27.32 KG/M2 | SYSTOLIC BLOOD PRESSURE: 140 MMHG | DIASTOLIC BLOOD PRESSURE: 96 MMHG | HEIGHT: 65 IN

## 2023-01-31 DIAGNOSIS — R10.13 EPIGASTRIC PAIN: ICD-10-CM

## 2023-01-31 DIAGNOSIS — K29.70 HELICOBACTER PYLORI GASTRITIS: ICD-10-CM

## 2023-01-31 DIAGNOSIS — Z86.010 HISTORY OF COLON POLYPS: ICD-10-CM

## 2023-01-31 DIAGNOSIS — B96.81 HELICOBACTER PYLORI GASTRITIS: ICD-10-CM

## 2023-01-31 DIAGNOSIS — K21.9 GASTROESOPHAGEAL REFLUX DISEASE WITHOUT ESOPHAGITIS: Primary | ICD-10-CM

## 2023-01-31 NOTE — PROGRESS NOTES
[address] [facility] - Outpatient Follow-up Note  Deneen Chavez 51 y o  female MRN: [id number]  Encounter: [id number]    ASSESSMENT AND PLAN:      1  Gastroesophageal reflux disease without esophagitis  2  Epigastric pain  3  Helicobacter pylori gastritis  Patient was positive for H  pylori on EGD biopsy 2022  Treated with Pylera  Denies any GI complications at this time  Denies any abdominal pain or nausea or vomiting  Patient states she had stopped taking the pantoprazole when her prescription ran out  She denies any acid reflux symptoms or breakthrough symptoms     - H  pylori antigen, stool    4  History of colon polyps  Colonoscopy 2022; 7-year recall      Followup Appointment: 1 year, as needed  ______________________________________________________________________    Chief Complaint   Patient presents with   • follow up     HPI: 50-year-old female accompanied by her daughter Maria Luz Ochoa with past medical history of GERD, diabetes, hypertension, hyperlipidemia presents for 3-month follow-up EGD and colonoscopy  Colonoscopy 2022; 2 polyps, hemorrhoid, 7-year recall  EGD 2022; EGD, negative biopsy for celiac, positive H  Pylori  Patient was treated for H  pylori with Pylera  Schedule patient for eradication testing for H  Pylori  On exam, patient denies any nausea, vomiting or abdominal pain  Denies any acid reflux symptoms and is no longer taking pantoprazole 40 mg daily  States she is having daily normal bowel movements  Denies hematochezia or melena        Historical Information   Past Medical History:   Diagnosis Date   • Diabetes mellitus ([facility] [address] )    • Hyperlipidemia    • Hypertension      Past Surgical History:   Procedure Laterality Date   • APPENDECTOMY     •  SECTION       Social History     Substance and Sexual Activity   Alcohol Use Not Currently     Social History     Substance and Sexual Activity   Drug Use Never     Social History     Tobacco Use Smoking Status Never   Smokeless Tobacco Never     Family History   Problem Relation Age of Onset   • Colon polyps Neg Hx    • Colon cancer Neg Hx          Current Outpatient Medications:   •  amLODIPine (NORVASC) 10 mg tablet  •  atorvastatin (LIPITOR) 20 mg tablet  •  dicyclomine (BENTYL) 10 mg capsule  •  losartan (COZAAR) 100 MG tablet  •  metFORMIN (GLUCOPHAGE) 1000 MG tablet  •  metoprolol succinate (TOPROL-XL) 50 mg 24 hr tablet  •  pantoprazole (PROTONIX) 40 mg tablet  •  traMADol (ULTRAM) 50 mg tablet  •  Trulicity 3 CX/7 3SI SOPN  •  bismuth-metronidazole-tetracycline (PYLERA) 140-125-125 MG per capsule  •  pantoprazole (PROTONIX) 40 mg tablet  No Known Allergies  Reviewed medications and allergies and updated as indicated    PHYSICAL EXAM:    Blood pressure 140/96, height 5' 5" (1 651 m), weight 74 4 kg (164 lb)  Body mass index is 27 29 kg/m²  Normal exam    General Appearance: NAD, cooperative, alert  Eyes: Anicteric, PERRLA, EOMI  ENT:  Normocephalic, atraumatic, normal mucosa  Neck:  Supple, symmetrical, trachea midline  Resp:  Clear to auscultation bilaterally; no rales, rhonchi or wheezing; respirations unlabored   CV:  S1 S2, Regular rate and rhythm; no murmur, rub, or gallop  GI:  Soft, non-tender, non-distended; normal bowel sounds; no masses, no organomegaly   Rectal: Deferred  Musculoskeletal: No cyanosis, clubbing or edema  Normal ROM    Skin:  No jaundice, rashes, or lesions   Heme/Lymph: No palpable cervical lymphadenopathy  Psych: Normal affect, good eye contact  Neuro: No gross deficits, AAOx3    Lab Results:   Lab Results   Component Value Date    WBC 6 9 08/29/2022    HGB 12 9 08/29/2022    HCT 39 3 08/29/2022    MCV 85 08/29/2022     08/29/2022     Lab Results   Component Value Date    K 4 0 08/29/2022     08/29/2022    CO2 20 08/29/2022    BUN 11 08/29/2022    CREATININE 0 73 08/29/2022    AST 20 08/29/2022    ALT 29 08/29/2022    EGFR 101 08/29/2022     No results found for: IRON, TIBC, FERRITIN  No results found for: LIPASE    Radiology Results:   No results found

## 2023-02-26 LAB — H PYLORI AG STL QL IA: NEGATIVE

## 2023-12-22 PROCEDURE — 99283 EMERGENCY DEPT VISIT LOW MDM: CPT

## 2023-12-23 ENCOUNTER — HOSPITAL ENCOUNTER (EMERGENCY)
Facility: HOSPITAL | Age: 48
Discharge: HOME | End: 2023-12-23
Attending: EMERGENCY MEDICINE
Payer: COMMERCIAL

## 2023-12-23 VITALS
RESPIRATION RATE: 18 BRPM | WEIGHT: 165 LBS | TEMPERATURE: 97.2 F | HEIGHT: 65 IN | DIASTOLIC BLOOD PRESSURE: 77 MMHG | HEART RATE: 81 BPM | BODY MASS INDEX: 27.49 KG/M2 | OXYGEN SATURATION: 100 % | SYSTOLIC BLOOD PRESSURE: 161 MMHG

## 2023-12-23 DIAGNOSIS — M54.50 ACUTE LOW BACK PAIN: Primary | ICD-10-CM

## 2023-12-23 DIAGNOSIS — R81 GLUCOSURIA: ICD-10-CM

## 2023-12-23 LAB
BILIRUB UR QL STRIP: NEGATIVE
CLARITY UR: CLEAR
COLOR UR: YELLOW
EXT PREGNANCY TEST URINE: NEGATIVE
EXT. CONTROL: NORMAL
GLUCOSE UR STRIP-MCNC: ABNORMAL MG/DL
HGB UR QL STRIP.AUTO: NEGATIVE
KETONES UR STRIP-MCNC: NEGATIVE MG/DL
LEUKOCYTE ESTERASE UR QL STRIP: NEGATIVE
NITRITE UR QL STRIP: NEGATIVE
PH UR STRIP.AUTO: 5 [PH]
PROT UR STRIP-MCNC: NEGATIVE MG/DL
SP GR UR STRIP.AUTO: 1.01 (ref 1–1.03)
UROBILINOGEN UR STRIP-ACNC: <2 MG/DL

## 2023-12-23 PROCEDURE — 96372 THER/PROPH/DIAG INJ SC/IM: CPT

## 2023-12-23 PROCEDURE — 81025 URINE PREGNANCY TEST: CPT | Performed by: EMERGENCY MEDICINE

## 2023-12-23 PROCEDURE — 99284 EMERGENCY DEPT VISIT MOD MDM: CPT | Performed by: EMERGENCY MEDICINE

## 2023-12-23 RX ORDER — METHOCARBAMOL 500 MG/1
500 TABLET, FILM COATED ORAL ONCE
Status: COMPLETED | OUTPATIENT
Start: 2023-12-23 | End: 2023-12-23

## 2023-12-23 RX ORDER — LIDOCAINE 50 MG/G
1 PATCH TOPICAL ONCE
Status: DISCONTINUED | OUTPATIENT
Start: 2023-12-23 | End: 2023-12-23 | Stop reason: HOSPADM

## 2023-12-23 RX ORDER — KETOROLAC TROMETHAMINE 30 MG/ML
15 INJECTION, SOLUTION INTRAMUSCULAR; INTRAVENOUS ONCE
Status: COMPLETED | OUTPATIENT
Start: 2023-12-23 | End: 2023-12-23

## 2023-12-23 RX ORDER — METHOCARBAMOL 500 MG/1
500 TABLET, FILM COATED ORAL 2 TIMES DAILY
Qty: 20 TABLET | Refills: 0 | Status: SHIPPED | OUTPATIENT
Start: 2023-12-23

## 2023-12-23 RX ADMIN — KETOROLAC TROMETHAMINE 15 MG: 30 INJECTION, SOLUTION INTRAMUSCULAR; INTRAVENOUS at 00:45

## 2023-12-23 RX ADMIN — LIDOCAINE 1 PATCH: 50 PATCH TOPICAL at 00:45

## 2023-12-23 RX ADMIN — METHOCARBAMOL TABLETS 500 MG: 500 TABLET, COATED ORAL at 00:45

## 2023-12-27 ENCOUNTER — NURSE TRIAGE (OUTPATIENT)
Dept: PHYSICAL THERAPY | Facility: OTHER | Age: 48
End: 2023-12-27

## 2023-12-27 NOTE — TELEPHONE ENCOUNTER
Called the patient to complete the triage process started today at 10 am. Call went to .    Voice message left for patient to call back. Phone number and hours of business provided.     Referral Closed.  Triage will be completed if a call back is received.

## 2023-12-27 NOTE — TELEPHONE ENCOUNTER
Additional Information   Negative: Is this related to a work injury?   Negative: Is this related to an MVA?   Negative: Are you currently recieving homecare services?    Background - Initial Assessment  Clinical complaint: Pain is center low back, no radiation into legs, no numbness or tingling. Started 12/21/23, NKI. No prior back pain or surgery. Pain comes and goes and is aching in nature. Seen in ED 12/23/23  Date of onset: 12/21/23  Frequency of pain: intermittent  Quality of pain: aching    Protocols used: [facility] Protocol

## 2024-06-14 NOTE — ED PROVIDER NOTES
History  Chief Complaint   Patient presents with    Back Pain     Low back pain x1.5 days. Denies injury, denies urinary complaints. Tylenol 1000mg @ 1700     48-year-old female presents for evaluation of low back pain that started 2-3 days ago.  Denies specific inciting factors.  Worse with movement and palpation.  Patient works in [employer]. Patient denies any trauma, unexplained weight loss, numbness or tingling, bowel or bladder incontinence, weakness, fever, IVDA, steroid use or known history of cancer.         Prior to Admission Medications   Prescriptions Last Dose Informant Patient Reported? Taking?   Trulicity 3 MG/0.5ML SOPN  Self Yes No   Sig: 3 MG (0.5 ML) SUBCUTANEOUSLY EVERYWEEK   amLODIPine (NORVASC) 10 mg tablet  Self Yes No   Sig: Take 10 mg by mouth daily   atorvastatin (LIPITOR) 20 mg tablet  Self Yes No   Sig: Take 20 mg by mouth daily   bismuth-metronidazole-tetracycline (PYLERA) 140-125-125 MG per capsule   No No   Sig: Take 3 capsules by mouth 4 (four) times a day (before meals and at bedtime) for 10 days   dicyclomine (BENTYL) 10 mg capsule   No No   Sig: TAKE 1 CAPSULE BY MOUTH 4 TIMES A DAY (BEFORE MEALS AND AT BEDTIME)   losartan (COZAAR) 100 MG tablet  Self Yes No   Sig: Take 100 mg by mouth daily   metFORMIN (GLUCOPHAGE) 1000 MG tablet  Self Yes No   Sig: Take 1,000 mg by mouth 2 (two) times a day   metoprolol succinate (TOPROL-XL) 50 mg 24 hr tablet  Self Yes No   Sig: Take 50 mg by mouth daily   pantoprazole (PROTONIX) 40 mg tablet   No No   Sig: TAKE 1 TABLET BY MOUTH EVERY DAY   pantoprazole (PROTONIX) 40 mg tablet   No No   Sig: Take 1 tablet (40 mg total) by mouth daily Take 1 tablet in the evening times 10 days to equal the required 2 tablets a day for 10 days for treatment of H pylori.   Patient not taking: Reported on 1/31/2023   traMADol (ULTRAM) 50 mg tablet  Self Yes No   Sig: Take 50 mg by mouth every 6 (six) hours as needed      Facility-Administered Medications:  I called Dionna in response to a referral that was received for patient to establish care with Surgical Oncology.     Outreach was made to schedule a consultation.    I left a voicemail explaining the reason for my call and advised patient to call [facility] at [phone].  Another attempt will be made to contact patient.    RBC   None       Past Medical History:   Diagnosis Date    Diabetes mellitus (HCC)     Hyperlipidemia     Hypertension        Past Surgical History:   Procedure Laterality Date    APPENDECTOMY       SECTION         Family History   Problem Relation Age of Onset    Colon polyps Neg Hx     Colon cancer Neg Hx      I have reviewed and agree with the history as documented.    E-Cigarette/Vaping    E-Cigarette Use Never User      E-Cigarette/Vaping Substances    Nicotine No     THC No     CBD No     Flavoring No     Other No     Unknown No      Social History     Tobacco Use    Smoking status: Never    Smokeless tobacco: Never   Vaping Use    Vaping status: Never Used   Substance Use Topics    Alcohol use: Not Currently    Drug use: Never       Review of Systems   Musculoskeletal:  Positive for back pain.       Physical Exam  Physical Exam  Vitals and nursing note reviewed.   Constitutional:       Appearance: She is well-developed.   HENT:      Head: Normocephalic and atraumatic.      Right Ear: External ear normal.      Left Ear: External ear normal.      Nose: Nose normal.   Eyes:      General: No scleral icterus.  Cardiovascular:      Rate and Rhythm: Normal rate.   Pulmonary:      Effort: Pulmonary effort is normal. No respiratory distress.   Abdominal:      General: There is no distension.   Musculoskeletal:         General: No deformity. Normal range of motion.      Cervical back: Normal range of motion.      Comments: Positive straight leg exam.  No evidence of saddle anesthesia.  Normal range of motion and strength.   Skin:     Findings: No rash.   Neurological:      General: No focal deficit present.      Mental Status: She is alert and oriented to person, place, and time.   Psychiatric:         Mood and Affect: Mood normal.         Vital Signs  ED Triage Vitals [23 0016]   Temperature Pulse Respirations Blood Pressure SpO2   (!) 97.2 °F (36.2 °C) 81 18 161/77 100 %      Temp Source Heart Rate Source  Patient Position - Orthostatic VS BP Location FiO2 (%)   Temporal Monitor Sitting Left arm --      Pain Score       8           Vitals:    12/23/23 0016   BP: 161/77   Pulse: 81   Patient Position - Orthostatic VS: Sitting         Visual Acuity      ED Medications  Medications   lidocaine (LIDODERM) 5 % patch 1 patch (1 patch Topical Medication Applied 12/23/23 0045)   ketorolac (TORADOL) injection 15 mg (15 mg Intramuscular Given 12/23/23 0045)   methocarbamol (ROBAXIN) tablet 500 mg (500 mg Oral Given 12/23/23 0045)       Diagnostic Studies  Results Reviewed       Procedure Component Value Units Date/Time    POCT pregnancy, urine [[id number]]  (Normal) Resulted: 12/23/23 0042    Lab Status: Final result Updated: 12/23/23 0042     EXT Preg Test, Ur Negative     Control Valid    UA w Reflex to Microscopic w Reflex to Culture [[id number]]  (Abnormal) Collected: 12/23/23 0022    Lab Status: Final result Specimen: Urine, Clean Catch Updated: 12/23/23 0035     Color, UA Yellow     Clarity, UA Clear     Specific Gravity, UA 1.015     pH, UA 5.0     Leukocytes, UA Negative     Nitrite, UA Negative     Protein, UA Negative mg/dl      Glucose,  (3/10%) mg/dl      Ketones, UA Negative mg/dl      Urobilinogen, UA <2.0 mg/dl      Bilirubin, UA Negative     Occult Blood, UA Negative                   No orders to display              Procedures  Procedures         ED Course                               SBIRT 20yo+      Flowsheet Row Most Recent Value   Initial Alcohol Screen: US AUDIT-C     1. How often do you have a drink containing alcohol? 0 Filed at: 12/23/2023 0020   2. How many drinks containing alcohol do you have on a typical day you are drinking?  0 Filed at: 12/23/2023 0020   3b. FEMALE Any Age, or MALE 65+: How often do you have 4 or more drinks on one occassion? 0 Filed at: 12/23/2023 0020   Audit-C Score 0 Filed at: 12/23/2023 0020   KAL: How many times in the past year have you...    Used an illegal drug or  used a prescription medication for non-medical reasons? Never Filed at: 12/23/2023 0020                      Medical Decision Making  48-year-old female presenting with acute low back pain consistent with musculoskeletal etiology.  Urinalysis/pregnancy test.  Symptom control.  Comprehensive spine referral.    Amount and/or Complexity of Data Reviewed  Labs: ordered.    Risk  Prescription drug management.             Disposition  Final diagnoses:   Acute low back pain   Glucosuria     Time reflects when diagnosis was documented in both MDM as applicable and the Disposition within this note       Time User Action Codes Description Comment    12/23/2023  1:06 AM Mazin Gallardo [M54.50] Acute low back pain     12/23/2023  1:06 AM Mazin Gallardo [R81] Glucosuria           ED Disposition       ED Disposition   Discharge    Condition   Stable    Date/Time   Sat Dec 23, 2023 0106    Comment   Ranjith Fletcher discharge to home/self care.                   Follow-up Information       Follow up With Specialties Details Why Contact Info Additional Information    Rony Costa MD Family Medicine   [address] [facility] [address]  [phone]        [facility] Emergency Department Emergency Medicine  If symptoms worsen [address] [facility] [place] [facility] [address] [phone] [facility] Emergency Department, [address] [place], [place] [address]            Discharge Medication List as of 12/23/2023  1:07 AM        START taking these medications    Details   methocarbamol (ROBAXIN) 500 mg tablet Take 1 tablet (500 mg total) by mouth 2 (two) times a day, Starting Sat 12/23/2023, Normal           CONTINUE these medications which have NOT CHANGED    Details   amLODIPine (NORVASC) 10 mg tablet Take 10 mg by mouth daily, Starting Sun 6/26/2022, Historical Med      atorvastatin (LIPITOR) 20 mg tablet Take 20 mg by mouth daily, Starting Tue  7/19/2022, Historical Med      bismuth-metronidazole-tetracycline (PYLERA) 140-125-125 MG per capsule Take 3 capsules by mouth 4 (four) times a day (before meals and at bedtime) for 10 days, Starting Thu 10/27/2022, Until Sun 11/6/2022, Normal      dicyclomine (BENTYL) 10 mg capsule TAKE 1 CAPSULE BY MOUTH 4 TIMES A DAY (BEFORE MEALS AND AT BEDTIME), Normal      losartan (COZAAR) 100 MG tablet Take 100 mg by mouth daily, Starting Fri 6/3/2022, Historical Med      metFORMIN (GLUCOPHAGE) 1000 MG tablet Take 1,000 mg by mouth 2 (two) times a day, Starting Sat 6/25/2022, Historical Med      metoprolol succinate (TOPROL-XL) 50 mg 24 hr tablet Take 50 mg by mouth daily, Starting Fri 6/17/2022, Historical Med      !! pantoprazole (PROTONIX) 40 mg tablet TAKE 1 TABLET BY MOUTH EVERY DAY, Normal      !! pantoprazole (PROTONIX) 40 mg tablet Take 1 tablet (40 mg total) by mouth daily Take 1 tablet in the evening times 10 days to equal the required 2 tablets a day for 10 days for treatment of H pylori., Starting Thu 10/27/2022, Normal      traMADol (ULTRAM) 50 mg tablet Take 50 mg by mouth every 6 (six) hours as needed, Starting Sun 5/1/2022, Historical Med      Trulicity 3 MG/0.5ML SOPN 3 MG (0.5 ML) SUBCUTANEOUSLY EVERYWEEK, Historical Med       !! - Potential duplicate medications found. Please discuss with provider.              PDMP Review       None            ED Provider  Electronically Signed by             Mazin Gallardo DO  12/23/23 0153

## 2024-11-19 ENCOUNTER — HOSPITAL ENCOUNTER (EMERGENCY)
Facility: HOSPITAL | Age: 49
Discharge: HOME | End: 2024-11-19
Attending: EMERGENCY MEDICINE
Payer: COMMERCIAL

## 2024-11-19 VITALS
TEMPERATURE: 99.5 F | SYSTOLIC BLOOD PRESSURE: 188 MMHG | OXYGEN SATURATION: 100 % | RESPIRATION RATE: 18 BRPM | HEART RATE: 88 BPM | BODY MASS INDEX: 27.49 KG/M2 | DIASTOLIC BLOOD PRESSURE: 108 MMHG | WEIGHT: 165 LBS | HEIGHT: 65 IN

## 2024-11-19 DIAGNOSIS — K02.9 PAIN DUE TO DENTAL CARIES: Primary | ICD-10-CM

## 2024-11-19 PROCEDURE — 64400 NJX AA&/STRD TRIGEMINAL NRV: CPT | Performed by: EMERGENCY MEDICINE

## 2024-11-19 PROCEDURE — 99284 EMERGENCY DEPT VISIT MOD MDM: CPT | Performed by: EMERGENCY MEDICINE

## 2024-11-19 PROCEDURE — 99282 EMERGENCY DEPT VISIT SF MDM: CPT

## 2024-11-19 RX ORDER — NAPROXEN 500 MG/1
500 TABLET ORAL 2 TIMES DAILY WITH MEALS
Qty: 30 TABLET | Refills: 0 | Status: SHIPPED | OUTPATIENT
Start: 2024-11-19

## 2024-11-19 RX ORDER — ROPIVACAINE HYDROCHLORIDE 5 MG/ML
15 INJECTION, SOLUTION EPIDURAL; INFILTRATION; PERINEURAL ONCE
Status: COMPLETED | OUTPATIENT
Start: 2024-11-19 | End: 2024-11-19

## 2024-11-19 RX ADMIN — ROPIVACAINE HYDROCHLORIDE 15 ML: 5 INJECTION, SOLUTION EPIDURAL; INFILTRATION; PERINEURAL at 01:09

## 2024-11-19 NOTE — ED PROVIDER NOTES
Time reflects when diagnosis was documented in both MDM as applicable and the Disposition within this note       Time User Action Codes Description Comment    11/19/2024  1:07 AM Sourav De Souza Add [K02.9] Pain due to dental caries           ED Disposition       ED Disposition   Discharge    Condition   Stable    Date/Time   Tue Nov 19, 2024  1:07 AM    Comment   Ranjith Fletcher discharge to home/self care.                   Assessment & Plan       Medical Decision Making    49 y.o. female presenting for dental pain.  BP elevated, otherwise VSS.  No findings to suggest airway compromise or submandibular/submental abscess.  No findings to suggest dry socket.  Patient currently taking amoxicillin.  Will treat symptomatically. Patient agreeable to inferior alveolar nerve block which was performed as documented above.    I have discussed with the patient our plan to discharge them from the ED and the patient is in agreement with this plan. The patient was provided a written after visit summary with strict RTED precautions.     Discharge Plan: Continue amoxicillin as prescribed by dentist. Will provide Rx for naproxen.    Followup: I have discussed with the patient plan to follow up with a dentist. Contact information provided in AVS.    Risk  Prescription drug management.             Medications   ropivacaine (NAROPIN) 0.5 % injection 15 mL (15 mL Perineural Given 11/19/24 0109)       ED Risk Strat Scores                           SBIRT 22yo+      Flowsheet Row Most Recent Value   Initial Alcohol Screen: US AUDIT-C     1. How often do you have a drink containing alcohol? 0 Filed at: 11/19/2024 0054   2. How many drinks containing alcohol do you have on a typical day you are drinking?  0 Filed at: 11/19/2024 0054   3a. Male UNDER 65: How often do you have five or more drinks on one occasion? 0 Filed at: 11/19/2024 0054   3b. FEMALE Any Age, or MALE 65+: How often do you have 4 or more drinks on one occassion? 0 Filed at:  2024   Audit-C Score 0 Filed at: 2024   KAL: How many times in the past year have you...    Used an illegal drug or used a prescription medication for non-medical reasons? Never Filed at: 2024                            History of Present Illness       Chief Complaint   Patient presents with    Dental Pain     Pt /co of dental pain and swelling after root canal Tuesday, dentist removed temporary crown today and given amoxicillin.       Past Medical History:   Diagnosis Date    Diabetes mellitus (HCC)     Hyperlipidemia     Hypertension       Past Surgical History:   Procedure Laterality Date    APPENDECTOMY       SECTION        Family History   Problem Relation Age of Onset    Colon polyps Neg Hx     Colon cancer Neg Hx       Social History     Tobacco Use    Smoking status: Never    Smokeless tobacco: Never   Vaping Use    Vaping status: Never Used   Substance Use Topics    Alcohol use: Not Currently    Drug use: Never      E-Cigarette/Vaping    E-Cigarette Use Never User       E-Cigarette/Vaping Substances    Nicotine No     THC No     CBD No     Flavoring No     Other No     Unknown No       I have reviewed and agree with the history as documented.     Ranjith Fletcher is a 49 y.o. year old female with PMH of HTN, HLD, DM presenting to the [facility] ED for dental pain. Patient underwent root canal 1 week ago and had a follow-up appointment yesterday at which time crown removed.  Patient was started on course of antibiotics at that time.  Since that time she is reporting persistent discomfort in the right lower jaw.  Pain is severe, constant and nonradiating.  No fevers or chills.. Patient denies associated change in voice, neck pain or swelling. No dyspnea. The patient has taken Tylenol and ibuprofen at home for symptomatic treatment.         History provided by:  Medical records, patient and relative   used: Yes (Patient offered formal , she  prefers daughter translate)    Dental Pain  Associated symptoms: no drooling, no fever and no neck pain        Review of Systems   Constitutional:  Negative for chills and fever.   HENT:  Positive for dental problem. Negative for drooling, trouble swallowing and voice change.    Respiratory:  Negative for shortness of breath.    Cardiovascular:  Negative for chest pain.   Gastrointestinal:  Negative for abdominal pain, nausea and vomiting.   Musculoskeletal:  Negative for neck pain and neck stiffness.   Skin:  Negative for rash.   All other systems reviewed and are negative.          Objective       ED Triage Vitals   Temperature Pulse Blood Pressure Respirations SpO2 Patient Position - Orthostatic VS   11/19/24 0039 11/19/24 0038 11/19/24 0040 11/19/24 0038 11/19/24 0038 --   99.5 °F (37.5 °C) 94 (!) 188/108 18 100 %       Temp src Heart Rate Source BP Location FiO2 (%) Pain Score    -- -- -- -- 11/19/24 0038        10 - Worst Possible Pain      Vitals      Date and Time Temp Pulse SpO2 Resp BP Pain Score FACES Pain Rating User   11/19/24 0040 -- -- -- -- 188/108 -- -- EK   11/19/24 0039 99.5 °F (37.5 °C) 88 -- -- -- -- -- EK   11/19/24 0038 -- 94 100 % 18 -- 10 - Worst Possible Pain -- EK            Physical Exam  Vitals and nursing note reviewed.   Constitutional:       General: She is not in acute distress.     Appearance: Normal appearance. She is well-developed. She is not ill-appearing, toxic-appearing or diaphoretic.   HENT:      Head: Normocephalic and atraumatic.      Jaw: There is normal jaw occlusion. No trismus, swelling, pain on movement or malocclusion.      Nose: No congestion or rhinorrhea.      Mouth/Throat:      Dentition: Abnormal dentition. Dental tenderness present. No gingival swelling or dental abscesses.      Pharynx: Uvula midline. No pharyngeal swelling, oropharyngeal exudate, posterior oropharyngeal erythema or uvula swelling.      Tonsils: No tonsillar exudate or tonsillar abscesses.      Eyes:      General:         Right eye: No discharge.         Left eye: No discharge.   Neck:      Trachea: Trachea and phonation normal.   Cardiovascular:      Rate and Rhythm: Normal rate and regular rhythm.   Pulmonary:      Effort: Pulmonary effort is normal. No accessory muscle usage or respiratory distress.      Breath sounds: Normal breath sounds. No stridor. No decreased breath sounds, wheezing, rhonchi or rales.   Musculoskeletal:      Cervical back: Normal range of motion and neck supple. No edema, erythema, rigidity or crepitus. No pain with movement.      Right lower leg: No tenderness.      Left lower leg: No tenderness.   Skin:     Capillary Refill: Capillary refill takes less than 2 seconds.      Findings: No rash.   Neurological:      Mental Status: She is alert and oriented to person, place, and time.   Psychiatric:         Mood and Affect: Mood normal.         Behavior: Behavior normal.         Results Reviewed       None            No orders to display       Nerve block    Date/Time: 11/19/2024 12:58 AM    Performed by: Sourav De Souza DO  Authorized by: Souarv De Souza DO    Patient location:  ED  [facility] Protocol:  Consent: Verbal consent obtained.  Risks and benefits: risks, benefits and alternatives were discussed  Consent given by: patient  Required items: required blood products, implants, devices, and special equipment available  Patient identity confirmed: verbally with patient    Indications:     Indications:  Pain relief  Location:     Body area:  Head    Head nerve blocked: inferior alveolar.    Nerve type:  Peripheral    Laterality:  Right  Skin anesthesia (see MAR for exact dosages):     Skin anesthesia method:  None  Procedure details (see MAR for exact dosages):     Block needle gauge:  30 G    Anesthetic injected:  Ropivacaine 0.5%    Steroid injected:  None    Additive injected:  None    Injection procedure:  Anatomic landmarks identified, anatomic landmarks palpated,  incremental injection, introduced needle and negative aspiration for blood  Post-procedure details:     Dressing:  None    Patient tolerance of procedure:  Tolerated well, no immediate complications      ED Medication and Procedure Management   Prior to Admission Medications   Prescriptions Last Dose Informant Patient Reported? Taking?   Trulicity 3 MG/0.5ML SOPN  Self Yes No   Sig: 3 MG (0.5 ML) SUBCUTANEOUSLY EVERYWEEK   amLODIPine (NORVASC) 10 mg tablet  Self Yes No   Sig: Take 10 mg by mouth daily   atorvastatin (LIPITOR) 20 mg tablet  Self Yes No   Sig: Take 20 mg by mouth daily   bismuth-metronidazole-tetracycline (PYLERA) 140-125-125 MG per capsule   No No   Sig: Take 3 capsules by mouth 4 (four) times a day (before meals and at bedtime) for 10 days   dicyclomine (BENTYL) 10 mg capsule   No No   Sig: TAKE 1 CAPSULE BY MOUTH 4 TIMES A DAY (BEFORE MEALS AND AT BEDTIME)   losartan (COZAAR) 100 MG tablet  Self Yes No   Sig: Take 100 mg by mouth daily   metFORMIN (GLUCOPHAGE) 1000 MG tablet  Self Yes No   Sig: Take 1,000 mg by mouth 2 (two) times a day   methocarbamol (ROBAXIN) 500 mg tablet   No No   Sig: Take 1 tablet (500 mg total) by mouth 2 (two) times a day   metoprolol succinate (TOPROL-XL) 50 mg 24 hr tablet  Self Yes No   Sig: Take 50 mg by mouth daily   pantoprazole (PROTONIX) 40 mg tablet   No No   Sig: TAKE 1 TABLET BY MOUTH EVERY DAY   pantoprazole (PROTONIX) 40 mg tablet   No No   Sig: Take 1 tablet (40 mg total) by mouth daily Take 1 tablet in the evening times 10 days to equal the required 2 tablets a day for 10 days for treatment of H pylori.   Patient not taking: Reported on 1/31/2023   traMADol (ULTRAM) 50 mg tablet  Self Yes No   Sig: Take 50 mg by mouth every 6 (six) hours as needed      Facility-Administered Medications: None     Discharge Medication List as of 11/19/2024  1:10 AM        CONTINUE these medications which have NOT CHANGED    Details   amLODIPine (NORVASC) 10 mg tablet Take 10  mg by mouth daily, Starting Sun 6/26/2022, Historical Med      atorvastatin (LIPITOR) 20 mg tablet Take 20 mg by mouth daily, Starting Tue 7/19/2022, Historical Med      bismuth-metronidazole-tetracycline (PYLERA) 140-125-125 MG per capsule Take 3 capsules by mouth 4 (four) times a day (before meals and at bedtime) for 10 days, Starting Thu 10/27/2022, Until Sun 11/6/2022, Normal      dicyclomine (BENTYL) 10 mg capsule TAKE 1 CAPSULE BY MOUTH 4 TIMES A DAY (BEFORE MEALS AND AT BEDTIME), Normal      losartan (COZAAR) 100 MG tablet Take 100 mg by mouth daily, Starting Fri 6/3/2022, Historical Med      metFORMIN (GLUCOPHAGE) 1000 MG tablet Take 1,000 mg by mouth 2 (two) times a day, Starting Sat 6/25/2022, Historical Med      methocarbamol (ROBAXIN) 500 mg tablet Take 1 tablet (500 mg total) by mouth 2 (two) times a day, Starting Sat 12/23/2023, Normal      metoprolol succinate (TOPROL-XL) 50 mg 24 hr tablet Take 50 mg by mouth daily, Starting Fri 6/17/2022, Historical Med      !! pantoprazole (PROTONIX) 40 mg tablet TAKE 1 TABLET BY MOUTH EVERY DAY, Normal      !! pantoprazole (PROTONIX) 40 mg tablet Take 1 tablet (40 mg total) by mouth daily Take 1 tablet in the evening times 10 days to equal the required 2 tablets a day for 10 days for treatment of H pylori., Starting Thu 10/27/2022, Normal      traMADol (ULTRAM) 50 mg tablet Take 50 mg by mouth every 6 (six) hours as needed, Starting Sun 5/1/2022, Historical Med      Trulicity 3 MG/0.5ML SOPN 3 MG (0.5 ML) SUBCUTANEOUSLY EVERYWEEK, Historical Med       !! - Potential duplicate medications found. Please discuss with provider.        No discharge procedures on file.  ED SEPSIS DOCUMENTATION   Time reflects when diagnosis was documented in both MDM as applicable and the Disposition within this note       Time User Action Codes Description Comment    11/19/2024  1:07 AM Sourav De Souza Add [K02.9] Pain due to dental caries                  Sourav De Souza,  DO  11/19/24 0431

## 2024-11-19 NOTE — DISCHARGE INSTRUCTIONS
You have been seen for dental pain. Please continue the amoxicillin as prescribed. Take naproxen and tylenol for pain. Return to the emergency department if you develop worsening pain, neck swelling, fevers or any other symptoms of concern. Please follow up with a dentist by calling the number provided.

## 2025-03-14 ENCOUNTER — TELEPHONE (OUTPATIENT)
Age: 50
End: 2025-03-14

## 2025-03-14 NOTE — TELEPHONE ENCOUNTER
Tabitha from Dr. Manriquez office called to have COL med records from 09/2022 sent over. Connected her with med recs.

## 2025-07-23 ENCOUNTER — OFFICE VISIT (OUTPATIENT)
Age: 50
End: 2025-07-23
Payer: COMMERCIAL

## 2025-07-23 VITALS
HEIGHT: 65 IN | HEART RATE: 68 BPM | SYSTOLIC BLOOD PRESSURE: 122 MMHG | WEIGHT: 156.6 LBS | DIASTOLIC BLOOD PRESSURE: 76 MMHG | BODY MASS INDEX: 26.09 KG/M2 | OXYGEN SATURATION: 97 %

## 2025-07-23 DIAGNOSIS — E08.65 DIABETES MELLITUS DUE TO UNDERLYING CONDITION WITH HYPERGLYCEMIA, WITHOUT LONG-TERM CURRENT USE OF INSULIN (HCC): Primary | ICD-10-CM

## 2025-07-23 DIAGNOSIS — I10 PRIMARY HYPERTENSION: ICD-10-CM

## 2025-07-23 DIAGNOSIS — E05.10 TOXIC SOLITARY THYROID NODULE: ICD-10-CM

## 2025-07-23 DIAGNOSIS — E04.2 MULTINODULAR GOITER: ICD-10-CM

## 2025-07-23 DIAGNOSIS — E78.5 HYPERLIPIDEMIA, UNSPECIFIED HYPERLIPIDEMIA TYPE: ICD-10-CM

## 2025-07-23 PROCEDURE — 99214 OFFICE O/P EST MOD 30 MIN: CPT | Performed by: INTERNAL MEDICINE

## 2025-07-23 RX ORDER — TIRZEPATIDE 7.5 MG/.5ML
7.5 INJECTION, SOLUTION SUBCUTANEOUS
COMMUNITY
Start: 2025-06-10

## 2025-07-23 RX ORDER — HYDROCHLOROTHIAZIDE 25 MG/1
1 TABLET ORAL DAILY
COMMUNITY
Start: 2025-06-09

## 2025-07-23 NOTE — ASSESSMENT & PLAN NOTE
Her LDL was 99 on 40 mg of atorvastatin with no lipid profile this year.  Based on repeat will assess need to change medications for a target LDL o f70    Orders:    Lipid panel; Future

## 2025-07-23 NOTE — ASSESSMENT & PLAN NOTE
"No Current HbA1c and treated with metformin 1000 mg twice daily. 10 mg of Mounjaro with last A1C of 7.4 12/14/24 representing room to improve her diabetes control.   Continue current regimen with no adjustments but consideration of to increase Mounjaro based on A1C:    Advised to adhere to diabetic diet, and recommended staying active/exercising routinely.  Discussed symptoms and treatment of hypoglycemia.    Recommended routine follow-up with Ophthalmology and foot exams.   Ordered blood work to complete prior to next visit.     No results found for: \"HGBA1C\"    Orders:    Comprehensive metabolic panel; Future    Hemoglobin A1C; Future    "

## 2025-07-23 NOTE — PROGRESS NOTES
"Name: Ranjith Fletcher      : 1975      MRN: [id number]  Encounter Provider: Radha Cole MD  Encounter Date: 2025   Encounter department: [facility] - [place]    Chief Complaint   Patient presents with    Diabetes Type 2     Follow up     :  Assessment & Plan  Diabetes mellitus due to underlying condition with hyperglycemia, without long-term current use of insulin (HCC)  No Current HbA1c and treated with metformin 1000 mg twice daily. 10 mg of Mounjaro with last A1C of 7.4 24 representing room to improve her diabetes control.   Continue current regimen with no adjustments but consideration of to increase Mounjaro based on A1C:    Advised to adhere to diabetic diet, and recommended staying active/exercising routinely.  Discussed symptoms and treatment of hypoglycemia.    Recommended routine follow-up with Ophthalmology and foot exams.   Ordered blood work to complete prior to next visit.     No results found for: \"HGBA1C\"    Orders:    Comprehensive metabolic panel; Future    Hemoglobin A1C; Future    Primary hypertension  Current blood pressure of 122/76 on amlodipine and HCTZ with no changes recommended       Hyperlipidemia, unspecified hyperlipidemia type    Her LDL was 99 on 40 mg of atorvastatin with no lipid profile this year.  Based on repeat will assess need to change medications for a target LDL o f70    Orders:    Lipid panel; Future    Toxic solitary thyroid nodule    She has a hot nodule that has been biopsied due to size.  Her last TSH was 0.177 which has been stable with no symptoms.  I have discussed surgical intervention vs. CABRERA.    Orders:    TSH, 3rd generation with Free T4 reflex; Future    US thyroid; Future    Multinodular goiter             Pertinent Medical History     none      History of Present Illness             Current diabetic regimen:          Review of Systems   Constitutional:  Negative for chills and fever.   HENT:  " "Negative for ear pain and sore throat.    Eyes:  Negative for pain and visual disturbance.   Respiratory:  Negative for cough and shortness of breath.    Cardiovascular:  Negative for chest pain and palpitations.   Gastrointestinal:  Negative for abdominal pain and vomiting.   Genitourinary:  Negative for dysuria and hematuria.   Musculoskeletal:  Negative for arthralgias and back pain.   Skin:  Negative for color change and rash.   Neurological:  Negative for seizures and syncope.   All other systems reviewed and are negative.   as per HPI         Medical History Reviewed by provider this encounter:     .    Objective   /76 (BP Location: Left arm, Patient Position: Sitting, Cuff Size: Standard)   Pulse 68   Ht 5' 5\" (1.651 m)   Wt 71 kg (156 lb 9.6 oz)   SpO2 97%   BMI 26.06 kg/m²      Body mass index is 26.06 kg/m².  Wt Readings from Last 3 Encounters:   07/23/25 71 kg (156 lb 9.6 oz)   11/19/24 74.8 kg (165 lb)   12/23/23 74.8 kg (165 lb)     Physical Exam  Vitals and nursing note reviewed.   Constitutional:       General: She is not in acute distress.     Appearance: She is well-developed.   HENT:      Head: Normocephalic and atraumatic.     Eyes:      Conjunctiva/sclera: Conjunctivae normal.     Neck:      Comments: Enlarged thyroid with palpable nodules  Cardiovascular:      Rate and Rhythm: Normal rate and regular rhythm.      Pulses: no weak pulses.           Dorsalis pedis pulses are 2+ on the right side and 2+ on the left side.        Posterior tibial pulses are 2+ on the right side and 2+ on the left side.      Heart sounds: No murmur heard.     No friction rub.   Pulmonary:      Effort: Pulmonary effort is normal. No respiratory distress.      Breath sounds: Normal breath sounds.   Abdominal:      Palpations: Abdomen is soft.      Tenderness: There is no abdominal tenderness.     Musculoskeletal:         General: No swelling.      Cervical back: Neck supple.   Feet:      Right foot:      Skin " integrity: No ulcer, skin breakdown, erythema, warmth, callus or dry skin.      Left foot:      Skin integrity: No ulcer, skin breakdown, erythema, warmth, callus or dry skin.     Skin:     General: Skin is warm and dry.      Capillary Refill: Capillary refill takes less than 2 seconds.     Neurological:      Mental Status: She is alert.     Psychiatric:         Mood and Affect: Mood normal.     Patient's shoes and socks removed.    Right Foot/Ankle   Right Foot Inspection  Skin Exam: skin normal and skin intact. No dry skin, no warmth, no callus, no erythema, no maceration, no abnormal color, no pre-ulcer, no ulcer and no callus.     Toe Exam: ROM and strength within normal limits.     Sensory   Vibration: intact  Proprioception: intact  Monofilament testing: intact    Vascular  The right DP pulse is 2+. The right PT pulse is 2+.     Left Foot/Ankle  Left Foot Inspection  Skin Exam: skin normal and skin intact. No dry skin, no warmth, no erythema, no maceration, normal color, no pre-ulcer, no ulcer and no callus.     Toe Exam: ROM and strength within normal limits.     Sensory   Vibration: intact  Proprioception: intact  Monofilament testing: intact    Vascular  The left DP pulse is 2+. The left PT pulse is 2+.     Assign Risk Category  No deformity present  No loss of protective sensation  No weak pulses  Risk: 0    Last Eye Exam: Not on file  Last Foot Exam: Not on file  Health Maintenance   Topic Date Due    Diabetic Eye Exam  Never done    Diabetic Foot Exam  07/23/2026         Labs: I have reviewed pertinent labs including:     There are no Patient Instructions on file for this visit.    Discussed with the patient and all questioned fully answered. She will call me if any problems arise.

## 2025-07-23 NOTE — ASSESSMENT & PLAN NOTE
She has a hot nodule that has been biopsied due to size.  Her last TSH was 0.177 which has been stable with no symptoms.  I have discussed surgical intervention vs. CABRERA.    Orders:    TSH, 3rd generation with Free T4 reflex; Future    US thyroid; Future

## 2025-07-24 DIAGNOSIS — E11.9 TYPE 2 DIABETES MELLITUS WITHOUT COMPLICATIONS (HCC): ICD-10-CM
